# Patient Record
Sex: FEMALE | Race: WHITE | NOT HISPANIC OR LATINO | Employment: OTHER | URBAN - METROPOLITAN AREA
[De-identification: names, ages, dates, MRNs, and addresses within clinical notes are randomized per-mention and may not be internally consistent; named-entity substitution may affect disease eponyms.]

---

## 2022-11-11 ENCOUNTER — TELEPHONE (OUTPATIENT)
Dept: BEHAVIORAL HEALTH | Facility: CLINIC | Age: 32
End: 2022-11-11

## 2022-11-11 ENCOUNTER — HOSPITAL ENCOUNTER (EMERGENCY)
Facility: CLINIC | Age: 32
Discharge: HOME | End: 2022-11-11
Attending: PSYCHIATRY & NEUROLOGY | Admitting: PSYCHIATRY & NEUROLOGY
Payer: COMMERCIAL

## 2022-11-11 VITALS
SYSTOLIC BLOOD PRESSURE: 135 MMHG | DIASTOLIC BLOOD PRESSURE: 60 MMHG | TEMPERATURE: 98.1 F | OXYGEN SATURATION: 100 % | RESPIRATION RATE: 18 BRPM | HEART RATE: 101 BPM

## 2022-11-11 DIAGNOSIS — R41.82 ALTERED MENTAL STATUS, UNSPECIFIED ALTERED MENTAL STATUS TYPE: ICD-10-CM

## 2022-11-11 DIAGNOSIS — F19.94 SUBSTANCE INDUCED MOOD DISORDER (H): ICD-10-CM

## 2022-11-11 PROCEDURE — 99282 EMERGENCY DEPT VISIT SF MDM: CPT | Performed by: PSYCHIATRY & NEUROLOGY

## 2022-11-11 PROCEDURE — 90791 PSYCH DIAGNOSTIC EVALUATION: CPT

## 2022-11-11 PROCEDURE — 99285 EMERGENCY DEPT VISIT HI MDM: CPT | Mod: 25 | Performed by: PSYCHIATRY & NEUROLOGY

## 2022-11-11 ASSESSMENT — ACTIVITIES OF DAILY LIVING (ADL)
ADLS_ACUITY_SCORE: 35

## 2022-11-11 ASSESSMENT — ENCOUNTER SYMPTOMS
HYPERACTIVE: 1
NEUROLOGICAL NEGATIVE: 1
CARDIOVASCULAR NEGATIVE: 1
GASTROINTESTINAL NEGATIVE: 1
DECREASED CONCENTRATION: 1
EYES NEGATIVE: 1
MUSCULOSKELETAL NEGATIVE: 1
RESPIRATORY NEGATIVE: 1
CONSTITUTIONAL NEGATIVE: 1
HALLUCINATIONS: 0

## 2022-11-11 NOTE — TELEPHONE ENCOUNTER
S:  Alison with Sean Healy #[phone] to give collateral info    Family contacted Sean Healy due to drug induced psychosis.  Pt has lost weight; not eating or sleeping and has been using meth.  Very grandiose and delusional.  Pt thinks she is a evette and the president.  Pt also thinks her residence is bugged.  Per witnesses Pt has been running out down the street banging on cars and putting self in risky situations.    Pt has also bee making live threats on Facebook threatening to skin and kill her ex partner and to Kill herself to save the world.      Pt brings in strangers; drug users into home; and other unsafe behaviors.    Hxc of Bipolar SILVERIO and MDD and not on her medications    Family members will be calling in additional collatoral    S:  Shari Umaña, friend #[phone] also called for collatoral info.  Pt has been using Meth and very erratic.  Also can provide text messages and facebook live for proof of active psychosis.    Pt thinks she controls the universe and is currently the President.  Pt has lost weight and maybe weighs about 80 lbs.  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

## 2022-11-11 NOTE — ED PROVIDER NOTES
"    [facility] [place]    11/11/22        History     Chief Complaint   Patient presents with     Delusional     Patient is having grandiose thoughts about being a evette, the president, and also making facebook threats towards her mo father.      HPI  Janis Mendoza is a 32 year old female with PMH of alcohol use disorder, methamphetamine use disorder, SILVERIO and depression who was brought into the Emergency Department for evaluation of delusions after using Methamphetamine. Patient was talking about the president. She currently appears calmer and does not exhibit sherita. She is re-directable. She appears calmer, elevated in mood, is smiling and eating snacks. Patient is not oriented to why she is here. She reports people are trying to take her kids away and alludes to a CPS case. Patient has 2 kids who currently live with their dads. She would like to see her kids and want them back. Patient reports no SI or HI. No hallucinations. Patient denies any drug use. She mentions not sleeping well.    [facility] Records reviewed.     Alison with Sean Healy #[phone] to give collateral info (11/11/22): Family contacted Sean Healy due to drug induced psychosis.  Pt has lost weight; not eating or sleeping and has been using meth.  Very grandiose and delusional.  Pt thinks she is a evette and the president.  Pt also thinks her residence is bugged.  Per witnesses Pt has been running out down the street banging on cars and putting self in risky situations. Pt has also been making threats on Facebook threatening to \"skin\" and kill her ex partner and to kill herself to save the world. Pt brings in strangers; drug users into home; and other unsafe behaviors. She has history of Bipolar, SILVERIO and MDD and not on her medications.  Family members will be calling in additional collatoral     S:  Shari Leeroy, friend #[phone] also called for collatoral info.  (11/11/22): Pt has " been using Meth and very erratic.  Also can provide text messages and facebook live for proof of active psychosis. Pt thinks she controls the universe and is currently the President.  Pt has lost weight and maybe weighs about 80 lbs.  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  Pt can be verbally threatening and aggressive and told a friend she was trying to obtain a gun. 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    Patient apparently is manipulative here but she appears to be in emotional and behavioral control and quite calm. Speech is no longer pressured. She is vague about her recent drug use. She adamantly denies being a danger to self or others.    Past Medical History  Past Medical History:   Diagnosis Date     Alcoholic (H)     recovering-last drink 3/2/2012     Drug abuse in remission (H)     Marijuanna, tramodol and cocaine- last drug on 10/4/2012     Past Surgical History:   Procedure Laterality Date     ENT SURGERY      wisdom teeth     No current outpatient medications on file.    No Known Allergies  Family History  Family History   Problem Relation Age of Onset     Unknown/Adopted Mother      Substance Abuse Father      Unknown/Adopted Maternal Grandmother      Unknown/Adopted Maternal Grandfather      Substance Abuse Paternal Grandmother      Unknown/Adopted Paternal Grandfather      Unknown/Adopted Brother      Unknown/Adopted Son      Social History   Social History     Tobacco Use     Smoking status: Every Day     Smokeless tobacco: Never     Tobacco comments:     is quitting 12/6/12   Substance Use Topics     Alcohol use: No     Comment: recovering alcoholic last drink 3/2/2012     Drug use: No     Comment: in remission--last drug use on 10/4/12      Past medical history, past surgical history, medications, allergies, family history, and social history were reviewed with the patient. No additional pertinent items.       Review of Systems    Constitutional: Negative.    HENT: Negative.    Eyes: Negative.    Respiratory: Negative.    Cardiovascular: Negative.    Gastrointestinal: Negative.    Genitourinary: Negative.    Musculoskeletal: Negative.    Skin: Negative.    Neurological: Negative.    Psychiatric/Behavioral: Positive for behavioral problems and decreased concentration. Negative for hallucinations and suicidal ideas. The patient is hyperactive.    All other systems reviewed and are negative.        Physical Exam   BP: 124/70  Pulse: 101  Temp: (!) 96.2  F (35.7  C)  Resp: 16  SpO2: 98 %  Physical Exam  Vitals and nursing note reviewed.   HENT:      Head: Normocephalic.   Eyes:      Pupils: Pupils are equal, round, and reactive to light.   Pulmonary:      Effort: Pulmonary effort is normal.   Musculoskeletal:         General: Normal range of motion.   Neurological:      General: No focal deficit present.      Mental Status: She is alert.   Psychiatric:         Attention and Perception: Perception normal. She is inattentive. She does not perceive auditory or visual hallucinations.         Mood and Affect: Mood and affect normal.         Speech: Speech normal.         Behavior: Behavior normal. Behavior is not agitated, aggressive, hyperactive or combative. Behavior is cooperative.         Thought Content: Thought content normal. Thought content is not paranoid or delusional. Thought content does not include homicidal or suicidal ideation.         Cognition and Memory: Cognition and memory normal.         Judgment: Judgment normal.         ED Course      Procedures          No results found for any visits on 11/11/22.  Medications - No data to display     Assessments & Plan (with Medical Decision Making)   Patient is here for a mental health assessment. She appeared to be under the influence prior to arrival and has since calmed down and does not exhibit psychosis here. She denies feeling suicidal, nor paranoid nor unsafe. Given collateral  information, she was offered an overnight stay in the ED for further monitoring. She declined our offer. She is not exhibiting the level of sherita or psychosis as witnessed in the community here in the ED. She is calm, cooperative and redirectable. She is not presently altered in her mentla state. There is no acute safety concern that warrants holding her here against her will. She reports planning on either staying at her father's place or mother's place as an alternative option. Patient can be released. She declines any offer of follow-up care or services.    I have reviewed the nursing notes. I have reviewed the findings, diagnosis, plan and need for follow up with the patient.    New Prescriptions    No medications on file       Final diagnoses:   Altered mental status, unspecified altered mental status type   Substance induced mood disorder (H)       --  Paul Still MD  [facility] EMERGENCY DEPARTMENT  11/11/2022     Paul Still MD  11/11/22 [id number]

## 2022-11-11 NOTE — CONSULTS
Diagnostic Evaluation Consultation  Crisis Assessment    Patient was assessed: In Person  Patient location: [facility]  Was a release of information signed: No. Reason: declined.      Referral Data and Chief Complaint  Patient is a 32 year old male presenting to the [facility] for the following concerns: erratic behavior, presumably secondary to methamphetamine use.      Informed Consent and Assessment Methods     Patient is her own guardian. Writer met with patient and explained the crisis assessment process, including applicable information disclosures and limits to confidentiality, assessed understanding of the process, and obtained consent to proceed with the assessment. Patient was observed to be able to participate in the assessment as evidenced by verbal consent. Assessment methods included conducting a formal interview with patient, review of medical records, collaboration with medical staff, and obtaining relevant collateral information from family and community providers when available.    Over the course of this crisis assessment provided reassurance, offered validation, engaged patient in problem solving and disposition planning, worked with patient on safety and aftercare planning, assisted in processing patient's thoughts and feeling relating to presenting concerns, and provided psychoeducation. Patient's response to interventions was fully engaged.     Summary of Patient Situation     Patient reports [facility] brought her into the emergency department tonight. Patient reports her friends and family were concerned, and she trusts them, but she denies any concern for herself. Patient states she does not cheat, steal, or lie. Patient reports she never meant to hurt anyone. Patient reports staff here have been kind, which she is grateful for and appreciates. Patient reports she nonetheless does not like the attention of being in the emergency department and wants to go  "home. Patient reports the only thing she wants is to reconnect with her children. Patient reports there is currently a custody dispute, and refers to CPS involvement. Patient does not provide details but discusses how her sons are living with their fathers instead of her. Patient reports she wants to defend herself and believes she can do so. Patient denies any other mental health or substance use needs. Patient lacks insight into presenting concerns, is not oriented to the actual situation. Please see collateral information below.    Brief Psychosocial History    Patient reports her parents are not . Patient reports having a brother named Nabil. Patient reports living next door to her father. Patient reports having 2 sons, ages 9 (Noman) and 12 (Carson), who are not currently in her care. Patient discuses CPS cases against her, presumably related to her substance abuse and poor self-care. Patient describes her family as dysfunctional but states they \"always find a way to get through it\". Per public record search, patient has history of 2 traffic violations including speeding and using her cell phone, both 2-3 years ago. Patient reports highest level of education was some college. Patient is not currently employed.    Significant Clinical History    Patient has no history of civil commitment. Patient is her own legal guardian. Patient has no history of inpatient mental health admissions. Patient reports history of chemical dependency treatment at [facility] [place] in 2012. Patient reports having an AA sponsor. Patient has not recently attended in person AA meetings, but rather attended by video or phone instead. Patient reports having primary care provider Norma Main at [facility].     Collateral Information    Per note from central intake earlier today:    Alison with Sean Healy #[phone]: Family contacted Sean Healy due to drug induced psychosis.  Pt has lost weight; not " "eating or sleeping and has been using meth. Very grandiose and delusional.  Pt thinks she is a evette and the president.  Pt also thinks her residence is bugged.  Per witnesses Pt has been running out down the street banging on cars and putting self in risky situations.     Pt has also bee making live threats on Facebook threatening to skin and kill her ex partner and to Kill herself to save the world.       Pt brings in strangers; drug users into home; and other unsafe behaviors.     Hxc of Bipolar SILVERIO and MDD and not on her medications     Shari Umaña, friend #[phone]: Pt has been using Meth and very erratic.  Also can provide text messages and facebook live for proof of active psychosis.     Pt thinks she controls the universe and is currently the President.  Pt has lost weight and maybe weighs about 80 lbs.  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     Risk Assessment  ESS-6  1.a. Over the past 2 weeks, have you had thoughts of killing yourself? Yes, patient reportedly made threats to \"kill herself to save the world\" prior to arrival.  1.b. Have you ever attempted to kill yourself and, if yes, when did this last happen? No   2. Recent or current suicide plan? No   3. Recent or current intent to act on ideation? No  4. Lifetime psychiatric hospitalization? No  5. Pattern of excessive substance use? Yes  6. Current irritability, agitation, or aggression? No  Scoring note: BOTH 1a and 1b must be yes for it to score 1 point, if both are not yes it is zero. All others are 1 point per number. If all questions 1a/1b - 6 are no, risk is negligible. If one of 1a/1b is yes, then risk is mild. If either question 2 or 3, but not both, is yes, then risk is automatically moderate regardless of total score. If " "both 2 and 3 are yes, risk is automatically high regardless of total score.      Score: 1, mild risk      Does the patient have access to lethal means? Patient has access to illicit substances.     Does the patient engage in non-suicidal self-injurious behavior (NSSI/SIB)? No     Does the patient have thoughts of harming others? Patient reportedly made threats to \"skin and kill her ex partner\" prior to arrival. Patient is currently in full behavioral control. Patient now denies any homicidal thoughts, plans, or intent.     Is the patient engaging in sexually inappropriate behavior? No       Current Substance Abuse     Is there recent substance abuse? Patient has reportedly been using methamphetamine.     Was a urine drug screen or blood alcohol level obtained: Ordered, not yet collected.       Mental Status Exam     Affect: Dramatic   Appearance: Appropriate    Attention Span/Concentration: Inattentive  Eye Contact: Engaged   Fund of Knowledge: Appropriate    Language /Speech Content: Fluent   Language /Speech Volume: Normal    Language /Speech Rate/Productions: Normal    Recent Memory: Intact   Remote Memory: Intact   Mood: Euphoric  Orientation to Person: Yes    Orientation to Place: Yes   Orientation to Time of Day: Yes    Orientation to Date: Yes    Situation (Do they understand why they are here?): No    Psychomotor Behavior: Normal    Thought Content: Clear   Thought Form: Intact      History of commitment: No       Medication    Psychotropic medications: No  Medication changes made in the last two weeks: No       Current Care Team    Primary Care Provider: LETICIA Villela,CNP   [facility]  Psychiatrist: No  Therapist: No  : No  CTSS or ARMHS: No  ACT Team: No  Other: No      Diagnosis    1 Unspecified depressive disorder F32.9      2 Unspecified anxiety disorder F41.9      3 Unspecified stimulant-related disorder; 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 Patient engaged fully in assessment process. Patient appears in no acute distress, smiling and eating snacks. Patient was reportedly using methamphetamine prior to arrival, but has since cleared and is not currently intoxicated. Patient does not demonstrate the disorganized behavior described by collateral contacts prior to arrival. Patient does not appear to be responding to internal stimuli. Patient does not make grandiose or manic comments. Patient denies any thoughts of harm to self or others. Patient is her own legal guardian, and therefore able to consent to or refuse treatment. Patient was offered overnight observation to clear or inpatient admission based on collateral reports, but declines. Patient is not holdable in current state. Patient will be discharged per her wishes.    Disposition    Recommended disposition: Substance Abuse Disorder Treatment and Rule 25/JENNIFER Assessment       Reviewed case and recommendations with attending provider. Attending Name: Dr. Paul Still       Attending concurs with disposition: Yes       Patient concurs with disposition: Yes       Guardian concurs with disposition: NA      Final disposition: Patient declined resources, prefers to follow up with established providers at [facility] and  instead. Patient was given chemical dependency resources should she choose to follow up.    Outpatient Details (if applicable):   Aftercare plan and appointments placed in the AVS and provided to patient: Yes    Was lethal means counseling provided as a part of aftercare planning? No      Assessment Details    Patient interview started at: 5:00pm and completed at: 5:45pm.     Total duration spent on the patient case in minutes: .75 hrs      CPT code(s) utilized: 43320 - Psychotherapy for Crisis - 60 (30-74*) min       LAYLA Burrell, KUSUM,  Psychotherapist  [facility] - Triage & Transition Services  Callback: [phone]    Aftercare Plan  If I am feeling unsafe or I am in a crisis, I will:   Contact my established care providers   Call the National Suicide Prevention Lifeline: 988  Go to the nearest emergency room   Call 911     Warning signs that I or other people might notice when a crisis is developing for me: increased substance use, any thoughts to harm yourself or others    Things I am able to do on my own to cope or help me feel better: Grounding Techniques:    Try to notice where you are, your surroundings including the people, the sounds like the TV or radio.    Concentrate on your breathing. Take a deep cleansing breath from your diaphragm. Count the breaths as you exhale. Make sure you breath slowly.    Hold something that you find comforting, for some it may be a stuffed animal or a blanket. Notice how it feels in your hands. Is it hard or soft?    During a non-crisis time make a list of positive affirmations. Print them out and keep them handy for times of intense anxiety. At those times, read them aloud.  Try the Jumpido game:    Name 5 things you can see in the room with you    Name 4 things you can feel ( chair on my back  or  feet on floor )     Name 3 things you can hear right now ( people talking  or  tv )     Name 2 things you can smell right now (or, 2 things you like the smell of)     Name 1 good thing about yourself  Create A Safe Place    Image a safe place -- it can be a real or imaginary place:     What do you see -- especially colors?     What sounds do you hear?     What sensations do you feel?     What smells do you smell?     What people or animals would you want in your safe place?     Imagine a protective bubble, wall or boundary around your safe place.     Imagine a door or gate with a guard at your safe place.     Image a lock and key to your safe place and only you can unlock it.    You can draw or make a collage that  represents your safe place.     Choose a souvenir of your safe place -- a color, an object, a song.     Keep your image of your safe place so you can come back to it when you need to.     Things I can use or do for distraction: Reduce Extreme Emotion  QUICKLY:  Changing Your Body Chemistry     T:  Change your body Temperature to change your autonomic nervous system   ? Use Ice Water to calm yourself down FAST   ? Put your face in a bowl of ice water (this is the best way; have the person keep his/her face in ice water for 30-45 seconds - initial research is showing that the longer s/he can hold her/his face in the water, the better the response), or   ? Splash ice water on your face, or hold an ice pack on your face      I:  Intensely exercise to calm down a body revved up by emotion   ? Examples: running, walking fast, jumping, playing basketball, weight lifting, swimming, calisthenics, etc.   ? Engage in exercises that DO NOT include violent behaviors. Exercises that utilize violent behaviors tend to function as  behavioral rehearsal,  and rather than calming the person down, may actually  rev  the person up more, increasing the likelihood of violence, and lessening the likelihood that they will  burn off  energy     P:  Progressively relax your muscles   ? Starting with your hands, moving to your forearms, upper arms, shoulders, neck, forehead, eyes, cheeks and lips, tongue and teeth, chest, upper back, stomach, buttocks, thighs, calves, ankles, feet   ? Tense (10 seconds,   of the way), then relax each muscle (all the way)   ? Notice the tension   ? Notice the difference when relaxed (by tensing first, and then relaxing, you are able to get a more thorough relaxation than by simply relaxing)     P: Paced breathing to relax   ? The standard technique is to begin with counting the number of steps one takes for a typical inhale, then counting the steps one takes for a typical exhale, and then lengthening the amount  "of steps for the exhalation by one or two steps.  OR  ? Repeat this pattern for 1-2 minutes  ? Inhale for four (4) seconds   ? Exhale for six (6) to eight (8) seconds   ? Research demonstrated that one can change one's overall level of anxiety by doing this exercise for even a few minutes per day     Changes I can make to support my mental health and wellness: please follow up with substance use resources as you wish     People in my life that I can ask for help: primary care provider, AA, friends and family     Your [facility] has a mental health crisis team you can call 24/7: [facility] Mobile Crisis  [phone]     Additional resources and information:     Crisis Lines  Crisis Text Line  Text 449091  You will be connected with a trained live crisis counselor to provide support.    Por latisha, texto  CAITY a 972378 o texto a 442-AYUDAME en WhatsApp    The Luis Project (LGBTQ Youth Crisis Line)  5.504.159.8752  text START to 355-026      [facility] [employer]  Fast Tracker  Linking people to mental health and substance use disorder resources  FSAstore.comn.People Publishing     [place] Mental Health Warm Line  Peer to peer support  Monday thru Saturday, 12 pm to 10 pm  158.710.8371 or 8.983.071.2284  Text \"Support\" to 81638    National [place] on Mental Illness (CAITLIN)  449.650.9097 or 1.888.CAITLIN.HELPS      Mental Health Apps  My3  https://myGini.netpp.org/    VirtualHopeBox  https://Takumii [place].org/apps/virtual-hope-box/      Additional Information  Today you were seen by a licensed mental health professional through Triage and Transition services, [facility] ([facility])  for a crisis assessment in the Emergency Department at [facility].  It is recommended that you follow up with your established providers (psychiatrist, mental health therapist, and/or primary care doctor - as relevant) as soon as possible. Coordinators from P will be calling you in the next 24-48 hours to ensure that you " have the resources you need.  You can also contact [facility] coordinators directly at [phone]. You may have been scheduled for or offered an appointment with a mental health provider. [facility] maintains an extensive network of licensed behavioral health providers to connect patients with the services they need.  We do not charge providers a fee to participate in our referral network.  We match patients with providers based on a patient's specific needs, insurance coverage, and location.  Our first effort will be to refer you to a provider within your care system, and will utilize providers outside your care system as needed.      Substance Use Disorder Direct Access Resources    It is recommended that you abstain from all mood altering chemicals. Please contact the sober support hotline (780-083-1122) as needed; phones are answered 24 hours a day, 7 days a week.    To access substance use treatment you must have a comprehensive assessment completed to begin any treatment program.     If uninsured, please contact your county of residence for eligibility screen to substance use disorder evaluation and treatment:    [place] - [phone]   [facility] [phone]   [facility] [phone]   [facility] [phone]   [facility] [phone]   [facility] [phone]   [facility] [phone]   [place] - [phone]     If you have private insurance, call the customer service number on the back of your insurance card to find an in-network substance abuse use disorder assessment. The ideal provider will be a treatment facility, licensed in the [facility] [place].     Community JENNIFER Evaluations: Clients may call their county for a full list of providers - Availability and services listed bel are subject to change, please call the provider to confirm    [facility]  [phone] [address], [place], [address]  *Please call the above number to schedule a comprehensive assessment for determination of level of care  needs. In person and virtual appointments available Mon-Fri.    [place] [facility], [address] Floor, [address], [place] [address] (next to the outpatient lab)    Phone: [phone]   Provides bridging services to people with Opiate Use Disorders (OUD) seeking care. This is a front door to Medication Assisted Treatments (MAT), ages 16+  Walk In hours: Monday-Friday 9:00am-3:00pm    [facility]  [phone]  Walk in Assessments: Mon-Friday 7a-1:45p  [address], [place], [address]    [facility] - [facility]  Central Access [phone]  [address], [place], [address]  *by appointment only    [facility]  [phone] (phone consultation available )  Locations in: [place], [facility], and [place], [place]  Sami virtual IOP [phone] or visit [email or web address]   Also offers LGBTQ programming     [facility]  [phone]  [address]  [place], [place], [address]  *Currently only offered via telehealth - call to set up an appointment    [facility]  [address] [place] [address], [address]  [facility]  For more information to to make a referral call:  [phone]  Walk-in on   9-11 a.m.    [facility]  [phone]  [address] [place], [place], [address]  *available by appointments only    [facility]  [phone]  [address] [place], [place], [address]  *available by appointment only    [facility]  [phone]  [address], [place], [address]  *walk in assessments available M-F starting at 7 am.    [facility]  [phone]  Locations: [facility], [facility], and [place]  *Walk in assessments availble M-F starting at 8 am -virtual only    [facility]  [phone]  [address] [place], [place]  [address]    [facility]  [facility] Locations: [place], [place], [place], [place], [facility], [facility], Anaya Cervantes   [phone]  *available by appointment only    [facility]  [phone]  [address]  [place], [place], [address]    [facility]  [phone]  [address]  [place], [place], [address]  *available by appointment    [facility]  [phone]  [address]  [place], [place], [address]  *walk ins available M-TH from 9-3    [facility]  MAT program: [phone]  [address] [place], [place], [address]    [place]  [phone]  Same day substance use disorder assessments are available Monday - Friday, via walk-in or by appointment at the [place] location.  [address], [address], [place] [address]     [facility] - adolescent and adult SUDs services  [phone]  Offer services Monday through Friday, as well as evening hours Monday through Thursday. Normally, a first appointment will be scheduled within one week  [email or web address]-[email or web address]-treatment  Locations all over [place]    If you are intoxicated, you may be required to detox at a detox facility before starting treatment. The following are detox facilities that you can self present to. All detox facilities are able to help you complete an assessment prior to discharge if you choose:    [facility]: Arrive at a [facility] for immediate medical evaluation    [facility]: [address] [place], [place], [address].         [phone]    [facility]: [address] [place], [place], [address]  [phone]     [facility] ([facility]): [address] [place], [place], [address]  [phone]     [facility]: [address], [place], [place], [address], [phone]         Ways to help cope with sobriety:    -- Take prescribed medicines as scheduled  --  Keep follow-up appointments  -- Talk to others about your concerns  -- Get regular exercise  -- Practice deep breathing skills  -- Eat a healthy diet  -- Use community resources, including hotline numbers, [facility] crisis and support meetings  -- Stay sober and avoid places/people/things associated with substance use  --Maintain a daily schedule/routine  --Get at least 7-8 hours of sleep per night  --Create a list 10--20 healthy activities that you can do that are enjoyable and do not involve substance use  --Create daily goals (approx. 1-4 goals) per day and work to achieve them throughout the day.       Free Resources:    [facility] ([facility])  [facility] connects people seeking recovery to resources that help foster and sustain long-term recovery. Whether you are seeking resources for treatment, transportation, housing, job training, education, health care or other pathways to recovery, [facility] is a great place to start.  Phone: 105.907.6129. www.minnesotaProject Repat.SI2 - Sistema de InformaÃ§Ã£o do Investidor (Great listing of all types of recovery and non-recovery related resources)    Alcoholics Anonymous  Phone: 6-341-ALCOHOL  Website: HTTP://WWW.AA.ORG/  AA [place] (812-520-6354 or http://aaminneaGeewa.org)  AA [place] (417-411-4662 or www.aastpaul.org)     Narcotics Anonymous  Phone: 501.213.1698  Website: www.Witel.[employer].    [employer] [facility]  [address] [place] [address], [address], [place], [place],  Phone: [phone]  Drop-in Hours: Monday-Friday 9-11:30 am. By appointment at other times.  Provides: [facility] is a drop-in center on the east side [facility] that provides a safe space for individuals who are homeless and have a history of chemical use. Sobriety is not a requirement but drugs and alcohol are not allowed on the property.  Services: Non-clients can access drop-in services such as Recovery and Harm Reduction Groups, referrals to case management, community activities, shower facilities, and a pool table. Individuals who  are homeless and have chemical health needs may be eligible for enrollment into Project Recovery's case management program. Clients and  work together to access benefits, treatment, health care, shelter, and external housing resources.

## 2022-11-11 NOTE — ED NOTES
AIDET completed upon patient's arrival to ED.   Patient does not have belongings with them.   Patient does not have shoes on. Writer left new pair of socks in patient's room with them.   Safety screen completed. Patient cooperative and calm, talkative, during search process.

## 2022-11-11 NOTE — ED TRIAGE NOTES
Patient arrives to the ED, very happy and chatty with staff. For example hugged paramedic upon them leaving. Calm/cooperative.

## 2022-11-12 NOTE — DISCHARGE INSTRUCTIONS
Follow-up established care and services    Aftercare Plan  If I am feeling unsafe or I am in a crisis, I will:   Contact my established care providers   Call the National Suicide Prevention Lifeline: 988  Go to the nearest emergency room   Call 911     Warning signs that I or other people might notice when a crisis is developing for me: increased substance use, any thoughts to harm yourself or others    Things I am able to do on my own to cope or help me feel better: Grounding Techniques:  Try to notice where you are, your surroundings including the people, the sounds like the TV or radio.  Concentrate on your breathing. Take a deep cleansing breath from your diaphragm. Count the breaths as you exhale. Make sure you breath slowly.  Hold something that you find comforting, for some it may be a stuffed animal or a blanket. Notice how it feels in your hands. Is it hard or soft?  During a non-crisis time make a list of positive affirmations. Print them out and keep them handy for times of intense anxiety. At those times, read them aloud.  Try the CeutiCare game:  Name 5 things you can see in the room with you  Name 4 things you can feel ( chair on my back  or  feet on floor )   Name 3 things you can hear right now ( people talking  or  tv )   Name 2 things you can smell right now (or, 2 things you like the smell of)   Name 1 good thing about yourself  Create A Safe Place  Image a safe place -- it can be a real or imaginary place:   What do you see -- especially colors?   What sounds do you hear?   What sensations do you feel?   What smells do you smell?   What people or animals would you want in your safe place?   Imagine a protective bubble, wall or boundary around your safe place.   Imagine a door or gate with a guard at your safe place.   Image a lock and key to your safe place and only you can unlock it.  You can draw or make a collage that represents your safe place.   Choose a souvenir of your safe place -- a color,  an object, a song.   Keep your image of your safe place so you can come back to it when you need to.    Things I can use or do for distraction: Reduce Extreme Emotion  QUICKLY:  Changing Your Body Chemistry      T:  Change your body Temperature to change your autonomic nervous system   Use Ice Water to calm yourself down FAST   Put your face in a bowl of ice water (this is the best way; have the person keep his/her face in ice water for 30-45 seconds - initial research is showing that the longer s/he can hold her/his face in the water, the better the response), or   Splash ice water on your face, or hold an ice pack on your face      I:  Intensely exercise to calm down a body revved up by emotion   Examples: running, walking fast, jumping, playing basketball, weight lifting, swimming, calisthenics, etc.   Engage in exercises that DO NOT include violent behaviors. Exercises that utilize violent behaviors tend to function as  behavioral rehearsal,  and rather than calming the person down, may actually  rev  the person up more, increasing the likelihood of violence, and lessening the likelihood that they will  burn off  energy     P:  Progressively relax your muscles   Starting with your hands, moving to your forearms, upper arms, shoulders, neck, forehead, eyes, cheeks and lips, tongue and teeth, chest, upper back, stomach, buttocks, thighs, calves, ankles, feet   Tense (10 seconds,   of the way), then relax each muscle (all the way)   Notice the tension   Notice the difference when relaxed (by tensing first, and then relaxing, you are able to get a more thorough relaxation than by simply relaxing)     P: Paced breathing to relax   The standard technique is to begin with counting the number of steps one takes for a typical inhale, then counting the steps one takes for a typical exhale, and then lengthening the amount of steps for the exhalation by one or two steps.  OR  Repeat this pattern for 1-2 minutes  Inhale for  "four (4) seconds   Exhale for six (6) to eight (8) seconds   Research demonstrated that one can change one's overall level of anxiety by doing this exercise for even a few minutes per day     Changes I can make to support my mental health and wellness: please follow up with substance use resources as you wish     People in my life that I can ask for help: primary care provider, AA, friends and family     Your [facility] has a mental health crisis team you can call 24/7: [facility] Mobile Crisis  [phone]     Additional resources and information:     Crisis Lines  Crisis Text Line  Text 435330  You will be connected with a trained live crisis counselor to provide support.    Por espanol, texto  CAITY a 056236 o texto a 442-AYUDAME en WhatsApp    The Luis Project (LGBTQ Youth Crisis Line)  3.965.392.0003  text START to 914-319      Collections  Fast Tracker  Linking people to mental health and substance use disorder resources  YPlan.[employer]     [place] Mental Health Warm Line  Peer to peer support  Monday thru Saturday, 12 pm to 10 pm  046.942.1098 or 8.904.832.9285  Text \"Support\" to 63049    National [place] on Mental Illness (CAITLIN)  743.391.4431 or 1.888.CAITLIN.HELPS      Mental Health Apps  My3  https://myEmpower Energies Inc.pp.org/    VirtualHopeBox  https://Club Emprende.org/apps/virtual-hope-box/      Additional Information  Today you were seen by a licensed mental health professional through Triage and Transition services, [facility] (P)  for a crisis assessment in the Emergency Department at [facility].  It is recommended that you follow up with your established providers (psychiatrist, mental health therapist, and/or primary care doctor - as relevant) as soon as possible. Coordinators from [facility] will be calling you in the next 24-48 hours to ensure that you have the resources you need.  You can also contact [facility] coordinators directly at [phone]. You may have been " scheduled for or offered an appointment with a mental health provider. [facility] maintains an extensive network of licensed behavioral health providers to connect patients with the services they need.  We do not charge providers a fee to participate in our referral network.  We match patients with providers based on a patient's specific needs, insurance coverage, and location.  Our first effort will be to refer you to a provider within your care system, and will utilize providers outside your care system as needed.      Substance Use Disorder Direct Access Resources    It is recommended that you abstain from all mood altering chemicals. Please contact the [facility] support hotline (190-624-7932) as needed; phones are answered 24 hours a day, 7 days a week.    To access substance use treatment you must have a comprehensive assessment completed to begin any treatment program.     If uninsured, please contact your county of residence for eligibility screen to substance use disorder evaluation and treatment:    [place] - [phone]   [facility] [phone]   [facility] [phone]   [facility] [phone]   [facility] [phone]   [facility] [phone]   [facility] [phone]   [place] - [phone]     If you have private insurance, call the customer service number on the back of your insurance card to find an in-network substance abuse use disorder assessment. The ideal provider will be a treatment facility, licensed in the state of [place].     Community JENNIFER Evaluations: Clients may call their county for a full list of providers - Availability and services listed belo are subject to change, please call the provider to confirm    [facility]  [phone]  [facility] [place], [place], [address]  *Please call the above number to schedule a comprehensive assessment for determination of level of care needs. In person and virtual appointments available Mon-Fri.    [facility], [address] [place] [address], First Floor, [address], [place] [address] (next to the outpatient lab)    Phone: [phone]   Provides bridging services to people with Opiate Use Disorders (OUD) seeking care. This is a front door to Medication Assisted Treatments (MAT), ages 16+  Walk In hours: Monday-Friday 9:00am-3:00pm    [facility]  [phone]  Walk in Assessments: Mon-Friday 7a-1:45p  [address], [place], [address]    [facility]  Central Access [phone]  [address] [place], [place], [address]  *by appointment only    [facility]  [phone] (phone consultation available )  Locations in: [place], [facility] [place], and [place], [place]  German virtual IOP [phone] or visit Santosh[employer]/GEORGI   Also offers LGBTQ programming     [facility]  [phone]  [address]  [place], [place], [address]  *Currently only offered via telehealth - call to set up an appointment    [facility]  [address] [place] [address], [address]  [facility]  For more information to to make a referral call:  [phone]  Walk-in on   9-11 a.m.    [facility]  [phone]  [address] [place], [place], [address]  *available by appointments only    [facility] specific  [phone]  [address] [place], [place], [address]  *available by appointment only    [facility]  [phone]  [address] [place], [place], [address]  *walk in assessments available M-F starting at 7 am.    [facility]  [phone]  Locations: [facility], [facility], and [place]  *Walk in assessments availble M-F starting at 8 am -virtual only    [facility]  [phone]  [address] [place] [address]    [facility]  Virtual + Locations: [place], [place], [place]  [facility], [facility]   [phone]  *available by appointment only    [facility]  [phone]  [address]  [place], [address]    [facility]  [phone]  [address]  [place], [place], [address]  *available by appointment    [facility]  [phone]  [address]  [place], [place], [address]  *walk ins available M-TH from 9-3    [facility]  MAT program: [phone]  [address] [place], [place], [address]    [place]  [phone]  Same day substance use disorder assessments are available Monday - Friday, via walk-in or by appointment at the [place] location.  [facility], [address], [place] [address]     [facility] - adolescent and adult SUDs services  [phone]  Offer services Monday through Friday, as well as evening hours Monday through Thursday. Normally, a first appointment will be scheduled within one week  [email or web address]-treatment  Locations all over [place]    If you are intoxicated, you may be required to detox at a detox facility before starting treatment. The following are detox facilities that you can self present to. All detox facilities are able to help you complete an assessment prior to discharge if you choose:    [facility]: Arrive at a [facility] for immediate medical evaluation    [facility]: [address] [place], [place], [address].         [phone]    [facility]: [address] [place], [place], [address]  [phone]     [facility]): [address] [place], [place], [address] [phone]     [facility]: [address], [place], [place], [address], [phone]         Ways to help cope with sobriety:    -- Take prescribed medicines as scheduled  -- Keep follow-up appointments  -- Talk to others about your concerns  -- Get regular exercise  -- Practice deep  breathing skills  -- Eat a healthy diet  -- Use community resources, including hotline numbers, [facility] crisis and support meetings  -- Stay sober and avoid places/people/things associated with substance use  --Maintain a daily schedule/routine  --Get at least 7-8 hours of sleep per night  --Create a list 10--20 healthy activities that you can do that are enjoyable and do not involve substance use  --Create daily goals (approx. 1-4 goals) per day and work to achieve them throughout the day.       Free Resources:    [facility] ([facility])  [facility] connects people seeking recovery to resources that help foster and sustain long-term recovery. Whether you are seeking resources for treatment, transportation, housing, job training, education, health care or other pathways to recovery, [facility] is a great place to start.  Phone: 765.537.4245. www.[place]ZeroPercent.us.[employer] (Great listing of all types of recovery and non-recovery related resources)    Alcoholics Anonymous  Phone: 5-601-ALCOHOL  Website: HTTP://WWW.AA.ORG/  [facility] [place] (870-059-1562 or http://aaAzulStar.org)  AA [place] (539-088-5690 or www.aasaul.org)     Narcotics Anonymous  Phone: 855.120.4874  Website: www.Boxcar.BrieFix.    [employer] [address], [place], [place],  Phone: [phone]  Drop-in Hours: Monday-Friday 9-11:30 am. By appointment at other times.  Provides: [facility] is a drop-in center on the east side [facility] that provides a safe space for individuals who are homeless and have a history of chemical use. Sobriety is not a requirement but drugs and alcohol are not allowed on the property.  Services: Non-clients can access drop-in services such as Recovery and Harm Reduction Groups, referrals to case management, community activities, shower facilities, and a pool table. Individuals who are homeless and have chemical health needs may be eligible for enrollment into [facility]'s case  management program. Clients and  work together to access benefits, treatment, health care, shelter, and external housing resources.

## 2022-11-12 NOTE — ED NOTES
Patient was given discharge paperwork. Patient did not want to stay in her room and was talking with other patients in the hallway, was not redirectable. Dr. Still has stated that she is not holdable and that she is discharged. Patient was offered shoes and layers of clothing, but she declined. She states she is use to not having anything. Patient was brought to [facility] where she was told to wait for Melisa who was picking her up. Patient states she does not want to stay here and is not going to stay in a shelter, even if she was helped with finding shelter. Patient understands her resources and is aware that she can always come back to the ED if she needs help.

## 2023-02-03 ENCOUNTER — LAB (OUTPATIENT)
Dept: LAB | Facility: HOSPITAL | Age: 33
End: 2023-02-03
Payer: COMMERCIAL

## 2023-02-03 DIAGNOSIS — Z51.81 MEDICATION MONITORING ENCOUNTER: Primary | ICD-10-CM

## 2023-02-03 LAB
ALBUMIN SERPL BCG-MCNC: 4.6 G/DL (ref 3.5–5.2)
ALP SERPL-CCNC: 57 U/L (ref 35–104)
ALT SERPL W P-5'-P-CCNC: 12 U/L (ref 10–35)
ANION GAP SERPL CALCULATED.3IONS-SCNC: 8 MMOL/L (ref 7–15)
AST SERPL W P-5'-P-CCNC: 17 U/L (ref 10–35)
BILIRUB SERPL-MCNC: <0.2 MG/DL
BUN SERPL-MCNC: 11.1 MG/DL (ref 6–20)
CALCIUM SERPL-MCNC: 9.8 MG/DL (ref 8.6–10)
CHLORIDE SERPL-SCNC: 102 MMOL/L (ref 98–107)
CREAT SERPL-MCNC: 0.64 MG/DL (ref 0.51–0.95)
DEPRECATED HCO3 PLAS-SCNC: 29 MMOL/L (ref 22–29)
ERYTHROCYTE [DISTWIDTH] IN BLOOD BY AUTOMATED COUNT: 17.2 % (ref 10–15)
FOLATE SERPL-MCNC: >40 NG/ML (ref 4.6–34.8)
GFR SERPL CREATININE-BSD FRML MDRD: >90 ML/MIN/1.73M2
GLUCOSE SERPL-MCNC: 100 MG/DL (ref 70–99)
HCT VFR BLD AUTO: 39.1 % (ref 35–47)
HGB BLD-MCNC: 12.1 G/DL (ref 11.7–15.7)
LAB DIRECTOR COMMENTS: NORMAL
LAB DIRECTOR DISCLAIMER: NORMAL
LAB DIRECTOR INTERPRETATION: NORMAL
LAB DIRECTOR METHODOLOGY: NORMAL
LAB DIRECTOR RESULTS: NORMAL
LAB DIRECTOR RESULTS: NORMAL
MCH RBC QN AUTO: 27.8 PG (ref 26.5–33)
MCHC RBC AUTO-ENTMCNC: 30.9 G/DL (ref 31.5–36.5)
MCV RBC AUTO: 90 FL (ref 78–100)
PLATELET # BLD AUTO: 268 10E3/UL (ref 150–450)
POTASSIUM SERPL-SCNC: 4 MMOL/L (ref 3.4–5.3)
PROT SERPL-MCNC: 6.8 G/DL (ref 6.4–8.3)
RBC # BLD AUTO: 4.35 10E6/UL (ref 3.8–5.2)
SODIUM SERPL-SCNC: 139 MMOL/L (ref 136–145)
SPECIMEN DESCRIPTION: NORMAL
TSH SERPL DL<=0.005 MIU/L-ACNC: 0.83 UIU/ML (ref 0.3–4.2)
VIT B12 SERPL-MCNC: 782 PG/ML (ref 232–1245)
WBC # BLD AUTO: 10.6 10E3/UL (ref 4–11)

## 2023-02-03 PROCEDURE — 84443 ASSAY THYROID STIM HORMONE: CPT

## 2023-02-03 PROCEDURE — 80053 COMPREHEN METABOLIC PANEL: CPT

## 2023-02-03 PROCEDURE — 36415 COLL VENOUS BLD VENIPUNCTURE: CPT

## 2023-02-03 PROCEDURE — 81291 MTHFR GENE: CPT

## 2023-02-03 PROCEDURE — G0452 MOLECULAR PATHOLOGY INTERPR: HCPCS | Mod: 26 | Performed by: PATHOLOGY

## 2023-02-03 PROCEDURE — 85027 COMPLETE CBC AUTOMATED: CPT

## 2023-02-03 PROCEDURE — 82746 ASSAY OF FOLIC ACID SERUM: CPT

## 2023-02-03 PROCEDURE — 82306 VITAMIN D 25 HYDROXY: CPT

## 2023-02-03 PROCEDURE — 82607 VITAMIN B-12: CPT

## 2023-02-03 PROCEDURE — 81418 RX METAB GEN SEQ ALYS PNL 6: CPT

## 2023-02-04 LAB — DEPRECATED CALCIDIOL+CALCIFEROL SERPL-MC: 31 UG/L (ref 20–75)

## 2025-01-20 LAB
GO4PGX COMMENTS: NORMAL
GO4PGX DISCLAIMER: NORMAL
GO4PGX LIMITATIONS: NORMAL
GO4PGX METHODOLOGY: NORMAL
LAB DIRECTOR RESULTS: NORMAL